# Patient Record
Sex: FEMALE | Race: WHITE | Employment: OTHER | URBAN - METROPOLITAN AREA
[De-identification: names, ages, dates, MRNs, and addresses within clinical notes are randomized per-mention and may not be internally consistent; named-entity substitution may affect disease eponyms.]

---

## 2017-08-30 NOTE — PROCEDURE NOTE: CLINICAL
PROCEDURE NOTE: YAG Capsulotomy OS. Diagnosis: Visually Significant [address]. Anesthesia: Topical. Prior to treatment, the risks/benefits/alternatives were discussed. The patient wished to proceed with procedure. Power = 2.8 mJ. Number of pulses = *. Patient tolerated procedure well. There were no complications. Post-procedure instructions given. Ysabel Jeronimo

## 2017-09-19 NOTE — PATIENT DISCUSSION
The patient is status post Yag laser Capsulotomy in the left eye. The vision shows nice improvement.

## 2020-05-06 ENCOUNTER — NEW REFERRAL (OUTPATIENT)
Dept: URBAN - METROPOLITAN AREA CLINIC 26 | Facility: CLINIC | Age: 85
End: 2020-05-06

## 2020-05-06 VITALS
SYSTOLIC BLOOD PRESSURE: 134 MMHG | HEIGHT: 62 IN | BODY MASS INDEX: 24.84 KG/M2 | WEIGHT: 135 LBS | HEART RATE: 73 BPM | DIASTOLIC BLOOD PRESSURE: 80 MMHG

## 2020-05-06 DIAGNOSIS — H35.373: ICD-10-CM

## 2020-05-06 DIAGNOSIS — H35.61: ICD-10-CM

## 2020-05-06 DIAGNOSIS — H43.813: ICD-10-CM

## 2020-05-06 DIAGNOSIS — H35.3211: ICD-10-CM

## 2020-05-06 PROCEDURE — 92250 FUNDUS PHOTOGRAPHY W/I&R: CPT

## 2020-05-06 PROCEDURE — 99204 OFFICE O/P NEW MOD 45 MIN: CPT

## 2020-05-06 PROCEDURE — 67028 INJECTION EYE DRUG: CPT

## 2020-05-06 PROCEDURE — 92235 FLUORESCEIN ANGRPH MLTIFRAME: CPT

## 2020-05-06 PROCEDURE — 92134 CPTRZ OPH DX IMG PST SGM RTA: CPT

## 2020-05-06 PROCEDURE — 76512 OPH US DX B-SCAN: CPT

## 2020-05-06 ASSESSMENT — VISUAL ACUITY
OD_SC: 20/30-1
OD_CC: 20/25-1
OS_SC: 20/60
OS_CC: 20/25+2

## 2020-05-06 ASSESSMENT — TONOMETRY
OD_IOP_MMHG: 12
OS_IOP_MMHG: 14

## 2020-05-19 ENCOUNTER — UNSCHEDULED FOLLOW UP (OUTPATIENT)
Dept: URBAN - METROPOLITAN AREA CLINIC 26 | Facility: CLINIC | Age: 85
End: 2020-05-19

## 2020-05-19 DIAGNOSIS — H43.11: ICD-10-CM

## 2020-05-19 DIAGNOSIS — H35.3211: ICD-10-CM

## 2020-05-19 DIAGNOSIS — H43.813: ICD-10-CM

## 2020-05-19 DIAGNOSIS — H35.373: ICD-10-CM

## 2020-05-19 DIAGNOSIS — H35.61: ICD-10-CM

## 2020-05-19 PROCEDURE — 76512 OPH US DX B-SCAN: CPT

## 2020-05-19 PROCEDURE — 92014 COMPRE OPH EXAM EST PT 1/>: CPT

## 2020-05-19 ASSESSMENT — TONOMETRY
OS_IOP_MMHG: 8
OD_IOP_MMHG: 11

## 2020-05-19 ASSESSMENT — VISUAL ACUITY
OS_PH: 20/25-2
OD_SC: 20/80-1
OS_SC: 20/50-2

## 2020-06-04 ENCOUNTER — FOLLOW UP (OUTPATIENT)
Dept: URBAN - METROPOLITAN AREA CLINIC 26 | Facility: CLINIC | Age: 85
End: 2020-06-04

## 2020-06-04 DIAGNOSIS — H35.373: ICD-10-CM

## 2020-06-04 DIAGNOSIS — H43.813: ICD-10-CM

## 2020-06-04 DIAGNOSIS — H35.61: ICD-10-CM

## 2020-06-04 DIAGNOSIS — H43.11: ICD-10-CM

## 2020-06-04 DIAGNOSIS — H35.3211: ICD-10-CM

## 2020-06-04 PROCEDURE — 92134 CPTRZ OPH DX IMG PST SGM RTA: CPT

## 2020-06-04 PROCEDURE — 76512 OPH US DX B-SCAN: CPT

## 2020-06-04 PROCEDURE — 92250 FUNDUS PHOTOGRAPHY W/I&R: CPT

## 2020-06-04 PROCEDURE — 92012 INTRM OPH EXAM EST PATIENT: CPT

## 2020-06-04 ASSESSMENT — TONOMETRY
OS_IOP_MMHG: 12
OD_IOP_MMHG: 10

## 2020-06-04 ASSESSMENT — VISUAL ACUITY
OD_SC: 20/100-2
OS_SC: 20/40-1

## 2020-06-05 ENCOUNTER — FOLLOW UP (OUTPATIENT)
Dept: URBAN - METROPOLITAN AREA CLINIC 26 | Facility: CLINIC | Age: 85
End: 2020-06-05

## 2020-06-05 DIAGNOSIS — H35.61: ICD-10-CM

## 2020-06-05 DIAGNOSIS — H35.3211: ICD-10-CM

## 2020-06-05 DIAGNOSIS — H43.11: ICD-10-CM

## 2020-06-05 DIAGNOSIS — H43.813: ICD-10-CM

## 2020-06-05 DIAGNOSIS — H35.373: ICD-10-CM

## 2020-06-05 DIAGNOSIS — H31.8: ICD-10-CM

## 2020-06-05 PROCEDURE — 92012 INTRM OPH EXAM EST PATIENT: CPT

## 2020-06-05 RX ORDER — PREDNISOLONE ACETATE 10 MG/ML: 1 SUSPENSION/ DROPS OPHTHALMIC

## 2020-06-05 RX ORDER — TOBRAMYCIN 3 MG/ML: 1 SOLUTION/ DROPS OPHTHALMIC

## 2020-06-05 ASSESSMENT — VISUAL ACUITY
OD_SC: 20/70+2
OS_PH: 20/25
OS_SC: 20/50

## 2020-06-05 ASSESSMENT — TONOMETRY
OS_IOP_MMHG: 10
OD_IOP_MMHG: 09

## 2020-06-10 ENCOUNTER — SURGERY/PROCEDURE (OUTPATIENT)
Dept: URBAN - METROPOLITAN AREA SURGERY 10 | Facility: SURGERY | Age: 85
End: 2020-06-10

## 2020-06-10 DIAGNOSIS — H43.11: ICD-10-CM

## 2020-06-10 PROCEDURE — 67036 REMOVAL OF INNER EYE FLUID: CPT

## 2020-06-11 ENCOUNTER — POST OP-DILATION (OUTPATIENT)
Dept: URBAN - METROPOLITAN AREA CLINIC 33 | Facility: CLINIC | Age: 85
End: 2020-06-11

## 2020-06-11 DIAGNOSIS — H43.11: ICD-10-CM

## 2020-06-11 PROCEDURE — 99024 POSTOP FOLLOW-UP VISIT: CPT

## 2020-06-11 ASSESSMENT — TONOMETRY
OS_IOP_MMHG: 18
OD_IOP_MMHG: 08

## 2020-06-11 ASSESSMENT — VISUAL ACUITY: OS_SC: 20/40

## 2020-06-17 ENCOUNTER — POST OP-DILATION (OUTPATIENT)
Dept: URBAN - METROPOLITAN AREA CLINIC 26 | Facility: CLINIC | Age: 85
End: 2020-06-17

## 2020-06-17 DIAGNOSIS — H02.831: ICD-10-CM

## 2020-06-17 DIAGNOSIS — H31.8: ICD-10-CM

## 2020-06-17 DIAGNOSIS — H04.123: ICD-10-CM

## 2020-06-17 DIAGNOSIS — H43.813: ICD-10-CM

## 2020-06-17 DIAGNOSIS — H35.61: ICD-10-CM

## 2020-06-17 DIAGNOSIS — H35.3211: ICD-10-CM

## 2020-06-17 DIAGNOSIS — H43.11: ICD-10-CM

## 2020-06-17 DIAGNOSIS — H02.834: ICD-10-CM

## 2020-06-17 DIAGNOSIS — H35.373: ICD-10-CM

## 2020-06-17 PROCEDURE — 99024 POSTOP FOLLOW-UP VISIT: CPT

## 2020-06-17 ASSESSMENT — VISUAL ACUITY
OS_CC: 20/25
OD_CC: 20/40

## 2020-06-17 ASSESSMENT — TONOMETRY
OD_IOP_MMHG: 11
OS_IOP_MMHG: 12

## 2020-07-08 ENCOUNTER — FOLLOW UP (OUTPATIENT)
Dept: URBAN - METROPOLITAN AREA CLINIC 26 | Facility: CLINIC | Age: 85
End: 2020-07-08

## 2020-07-08 DIAGNOSIS — H31.8: ICD-10-CM

## 2020-07-08 DIAGNOSIS — G89.18: ICD-10-CM

## 2020-07-08 DIAGNOSIS — H35.3211: ICD-10-CM

## 2020-07-08 DIAGNOSIS — H35.373: ICD-10-CM

## 2020-07-08 DIAGNOSIS — H35.61: ICD-10-CM

## 2020-07-08 PROCEDURE — 67028 INJECTION EYE DRUG: CPT

## 2020-07-08 PROCEDURE — 92134 CPTRZ OPH DX IMG PST SGM RTA: CPT

## 2020-07-08 PROCEDURE — 92250 FUNDUS PHOTOGRAPHY W/I&R: CPT

## 2020-07-08 PROCEDURE — 99024 POSTOP FOLLOW-UP VISIT: CPT

## 2020-07-08 ASSESSMENT — VISUAL ACUITY
OD_SC: 20/30-2
OS_SC: 20/30
OS_PH: 20/25

## 2020-07-08 ASSESSMENT — TONOMETRY
OS_IOP_MMHG: 11
OD_IOP_MMHG: 13

## 2020-08-13 ENCOUNTER — CLINICAL PROCEDURE AND DIAGNOSTIC TESTING ONLY (OUTPATIENT)
Dept: URBAN - METROPOLITAN AREA CLINIC 26 | Facility: CLINIC | Age: 85
End: 2020-08-13

## 2020-08-13 DIAGNOSIS — H35.373: ICD-10-CM

## 2020-08-13 DIAGNOSIS — H35.3211: ICD-10-CM

## 2020-08-13 PROCEDURE — 92134 CPTRZ OPH DX IMG PST SGM RTA: CPT

## 2020-08-13 PROCEDURE — 67028 INJECTION EYE DRUG: CPT

## 2020-08-13 PROCEDURE — 92250 FUNDUS PHOTOGRAPHY W/I&R: CPT

## 2020-08-13 ASSESSMENT — TONOMETRY: OD_IOP_MMHG: 12

## 2020-08-13 ASSESSMENT — VISUAL ACUITY: OD_CC: 20/30

## 2020-08-24 ENCOUNTER — ADDENDUM (OUTPATIENT)
Dept: URBAN - METROPOLITAN AREA CLINIC 26 | Facility: CLINIC | Age: 85
End: 2020-08-24

## 2020-09-17 ENCOUNTER — FOLLOW UP AND POST INJECTION EVALUATION (OUTPATIENT)
Dept: URBAN - METROPOLITAN AREA CLINIC 26 | Facility: CLINIC | Age: 85
End: 2020-09-17

## 2020-09-17 DIAGNOSIS — H35.3211: ICD-10-CM

## 2020-09-17 DIAGNOSIS — H31.8: ICD-10-CM

## 2020-09-17 DIAGNOSIS — H35.373: ICD-10-CM

## 2020-09-17 DIAGNOSIS — H43.813: ICD-10-CM

## 2020-09-17 DIAGNOSIS — G89.18: ICD-10-CM

## 2020-09-17 DIAGNOSIS — H35.61: ICD-10-CM

## 2020-09-17 PROCEDURE — 67028 INJECTION EYE DRUG: CPT

## 2020-09-17 PROCEDURE — 92014 COMPRE OPH EXAM EST PT 1/>: CPT

## 2020-09-17 PROCEDURE — 92134 CPTRZ OPH DX IMG PST SGM RTA: CPT

## 2020-09-17 PROCEDURE — 92250 FUNDUS PHOTOGRAPHY W/I&R: CPT

## 2020-09-17 ASSESSMENT — TONOMETRY: OD_IOP_MMHG: 10

## 2020-09-17 ASSESSMENT — VISUAL ACUITY: OD_SC: 20/30

## 2020-11-05 ENCOUNTER — CLINICAL PROCEDURE AND DIAGNOSTIC TESTING ONLY (OUTPATIENT)
Dept: URBAN - METROPOLITAN AREA CLINIC 26 | Facility: CLINIC | Age: 85
End: 2020-11-05

## 2020-11-05 DIAGNOSIS — H35.3211: ICD-10-CM

## 2020-11-05 DIAGNOSIS — H35.373: ICD-10-CM

## 2020-11-05 PROCEDURE — 92250 FUNDUS PHOTOGRAPHY W/I&R: CPT

## 2020-11-05 PROCEDURE — 67028 INJECTION EYE DRUG: CPT

## 2020-11-05 ASSESSMENT — VISUAL ACUITY: OD_CC: 20/30-1

## 2020-11-05 ASSESSMENT — TONOMETRY: OD_IOP_MMHG: 10

## 2020-12-09 ENCOUNTER — CLINICAL PROCEDURE AND DIAGNOSTIC TESTING ONLY (OUTPATIENT)
Dept: URBAN - METROPOLITAN AREA CLINIC 26 | Facility: CLINIC | Age: 85
End: 2020-12-09

## 2020-12-09 DIAGNOSIS — H35.3211: ICD-10-CM

## 2020-12-09 DIAGNOSIS — H35.373: ICD-10-CM

## 2020-12-09 PROCEDURE — 67028 INJECTION EYE DRUG: CPT

## 2020-12-09 PROCEDURE — 92250 FUNDUS PHOTOGRAPHY W/I&R: CPT

## 2020-12-09 ASSESSMENT — VISUAL ACUITY: OD_CC: 20/25-2

## 2020-12-09 ASSESSMENT — TONOMETRY: OD_IOP_MMHG: 11

## 2021-02-11 ENCOUNTER — FOLLOW UP AND POST INJECTION EVALUATION (OUTPATIENT)
Dept: URBAN - METROPOLITAN AREA CLINIC 26 | Facility: CLINIC | Age: 86
End: 2021-02-11

## 2021-02-11 VITALS — HEIGHT: 60 IN | WEIGHT: 135 LBS | BODY MASS INDEX: 26.5 KG/M2

## 2021-02-11 DIAGNOSIS — H35.373: ICD-10-CM

## 2021-02-11 DIAGNOSIS — H43.813: ICD-10-CM

## 2021-02-11 DIAGNOSIS — H35.3211: ICD-10-CM

## 2021-02-11 DIAGNOSIS — H31.8: ICD-10-CM

## 2021-02-11 DIAGNOSIS — H35.61: ICD-10-CM

## 2021-02-11 PROCEDURE — 92250 FUNDUS PHOTOGRAPHY W/I&R: CPT

## 2021-02-11 PROCEDURE — 92014 COMPRE OPH EXAM EST PT 1/>: CPT

## 2021-02-11 PROCEDURE — 92134 CPTRZ OPH DX IMG PST SGM RTA: CPT

## 2021-02-11 ASSESSMENT — TONOMETRY
OS_IOP_MMHG: 13
OD_IOP_MMHG: 12

## 2021-02-11 ASSESSMENT — VISUAL ACUITY
OS_CC: 20/25+2
OD_CC: 20/25-1

## 2021-06-09 ENCOUNTER — FOLLOW UP (OUTPATIENT)
Dept: URBAN - METROPOLITAN AREA CLINIC 26 | Facility: CLINIC | Age: 86
End: 2021-06-09

## 2021-06-09 DIAGNOSIS — H31.8: ICD-10-CM

## 2021-06-09 DIAGNOSIS — H35.3211: ICD-10-CM

## 2021-06-09 DIAGNOSIS — H35.61: ICD-10-CM

## 2021-06-09 DIAGNOSIS — H35.373: ICD-10-CM

## 2021-06-09 DIAGNOSIS — H43.813: ICD-10-CM

## 2021-06-09 PROCEDURE — 92134 CPTRZ OPH DX IMG PST SGM RTA: CPT

## 2021-06-09 PROCEDURE — 92250 FUNDUS PHOTOGRAPHY W/I&R: CPT

## 2021-06-09 PROCEDURE — 92014 COMPRE OPH EXAM EST PT 1/>: CPT

## 2021-06-09 ASSESSMENT — TONOMETRY
OD_IOP_MMHG: 11
OS_IOP_MMHG: 9

## 2021-06-09 ASSESSMENT — VISUAL ACUITY
OS_CC: 20/25+2
OD_SC: 20/50-1
OD_CC: 20/25+2
OS_SC: 20/80-1

## 2022-01-25 NOTE — PROCEDURE NOTE: CLINICAL
PROCEDURE NOTE: Epilation Left Upper Lid. Diagnosis: Trichiasis without Entropion. Anesthesia: Topical. Prep: Betadine Flush. Aberrant lashes were epilated at the slit lamp using jeweler’s forceps. The patient tolerated the procedure well and left in good condition. Charline Cagle

## 2022-01-25 NOTE — PATIENT DISCUSSION
Discussed with patient if lash on lid margin continues to grow in misdirected, can perform hyfrecation in future.

## 2022-01-25 NOTE — PATIENT DISCUSSION
Recommended epilation. One lash removed from lid margin, but few other lashes removed from upper lid above lash line.

## 2022-06-22 ENCOUNTER — FOLLOW UP (OUTPATIENT)
Dept: URBAN - METROPOLITAN AREA CLINIC 26 | Facility: CLINIC | Age: 87
End: 2022-06-22

## 2022-06-22 VITALS — BODY MASS INDEX: 27.48 KG/M2 | WEIGHT: 140 LBS | HEIGHT: 60 IN

## 2022-06-22 DIAGNOSIS — H35.373: ICD-10-CM

## 2022-06-22 DIAGNOSIS — H35.3211: ICD-10-CM

## 2022-06-22 DIAGNOSIS — H43.813: ICD-10-CM

## 2022-06-22 DIAGNOSIS — H31.8: ICD-10-CM

## 2022-06-22 DIAGNOSIS — H04.123: ICD-10-CM

## 2022-06-22 DIAGNOSIS — H35.61: ICD-10-CM

## 2022-06-22 PROCEDURE — 92134 CPTRZ OPH DX IMG PST SGM RTA: CPT

## 2022-06-22 PROCEDURE — 92250 FUNDUS PHOTOGRAPHY W/I&R: CPT

## 2022-06-22 PROCEDURE — 92014 COMPRE OPH EXAM EST PT 1/>: CPT

## 2022-06-22 ASSESSMENT — TONOMETRY
OD_IOP_MMHG: 12
OS_IOP_MMHG: 12

## 2022-06-22 ASSESSMENT — VISUAL ACUITY
OS_SC: 20/30-1
OD_SC: 20/30+1